# Patient Record
Sex: FEMALE | Race: WHITE | Employment: STUDENT | RURAL
[De-identification: names, ages, dates, MRNs, and addresses within clinical notes are randomized per-mention and may not be internally consistent; named-entity substitution may affect disease eponyms.]

---
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
## 2021-04-05 DIAGNOSIS — F32.2 MODERATELY SEVERE MAJOR DEPRESSION (HCC): ICD-10-CM

## 2021-04-05 NOTE — TELEPHONE ENCOUNTER
Patient will be back home from [facility] in 2 weeks and will make a f/u med check with .  The pharmacy will be sending over a rx request for Bupropion SR

## 2021-04-06 DIAGNOSIS — F32.2 MODERATELY SEVERE MAJOR DEPRESSION (HCC): ICD-10-CM

## 2021-04-06 RX ORDER — BUPROPION HYDROCHLORIDE 150 MG/1
150 TABLET, EXTENDED RELEASE ORAL 2 TIMES DAILY
Qty: 60 TAB | Refills: 2 | Status: SHIPPED | OUTPATIENT
Start: 2021-04-06

## 2021-04-06 RX ORDER — BUPROPION HYDROCHLORIDE 150 MG/1
150 TABLET, EXTENDED RELEASE ORAL 2 TIMES DAILY
Qty: 60 TAB | Refills: 0 | OUTPATIENT
Start: 2021-04-06 | End: 2021-05-06

## 2021-04-06 NOTE — TELEPHONE ENCOUNTER
----- Message from Kelsi Harrell sent at 4/6/2021  4:16 PM EDT -----  Regarding: Dr. Starks Hornbrook: [phone]  General Message/Vendor Calls    Caller's first and last name: Gracie Meeks, Mother. Reason for call: Pt will be home in two weeks from [facility] to make appointment with pcp. Will run out of \"Wellbutrin\" before then, pharmacy has already sent refill request. Needs to confirm Rx can be filled before pt comes home and makes appointment. Callback required yes/no and why: Yes, confirm Rx refill request can be filled. Best contact number(s): [phone] - pt phone number, [phone] - mom phone number. Details to clarify the request: N/a.       Kelsi Harrell

## 2022-03-19 PROBLEM — E66.01 SEVERE OBESITY (HCC): Status: ACTIVE | Noted: 2020-12-17

## 2023-05-15 RX ORDER — BUPROPION HYDROCHLORIDE 150 MG/1
150 TABLET, EXTENDED RELEASE ORAL 2 TIMES DAILY
COMMUNITY
Start: 2021-04-06